# Patient Record
Sex: MALE | Race: AMERICAN INDIAN OR ALASKA NATIVE
[De-identification: names, ages, dates, MRNs, and addresses within clinical notes are randomized per-mention and may not be internally consistent; named-entity substitution may affect disease eponyms.]

---

## 2019-02-19 ENCOUNTER — HOSPITAL ENCOUNTER (EMERGENCY)
Dept: HOSPITAL 5 - ED | Age: 24
Discharge: HOME | End: 2019-02-19
Payer: COMMERCIAL

## 2019-02-19 VITALS — SYSTOLIC BLOOD PRESSURE: 140 MMHG | DIASTOLIC BLOOD PRESSURE: 83 MMHG
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
## 2019-02-19 DIAGNOSIS — R07.89: Primary | ICD-10-CM
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
## 2019-02-19 DIAGNOSIS — F12.10: ICD-10-CM

## 2019-02-19 PROCEDURE — 93005 ELECTROCARDIOGRAM TRACING: CPT

## 2019-02-19 PROCEDURE — 99282 EMERGENCY DEPT VISIT SF MDM: CPT

## 2019-02-19 PROCEDURE — 93010 ELECTROCARDIOGRAM REPORT: CPT
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
